# Patient Record
Sex: FEMALE | Race: BLACK OR AFRICAN AMERICAN
[De-identification: names, ages, dates, MRNs, and addresses within clinical notes are randomized per-mention and may not be internally consistent; named-entity substitution may affect disease eponyms.]

---

## 2021-04-11 ENCOUNTER — HOSPITAL ENCOUNTER (EMERGENCY)
Dept: HOSPITAL 61 - ER | Age: 26
Discharge: HOME | End: 2021-04-11
Payer: SELF-PAY

## 2021-04-11 VITALS — WEIGHT: 133.6 LBS | BODY MASS INDEX: 22.81 KG/M2 | HEIGHT: 64 IN

## 2021-04-11 VITALS — SYSTOLIC BLOOD PRESSURE: 118 MMHG | DIASTOLIC BLOOD PRESSURE: 77 MMHG

## 2021-04-11 DIAGNOSIS — R30.9: Primary | ICD-10-CM

## 2021-04-11 DIAGNOSIS — J45.909: ICD-10-CM

## 2021-04-11 DIAGNOSIS — Z91.018: ICD-10-CM

## 2021-04-11 DIAGNOSIS — Z20.2: ICD-10-CM

## 2021-04-11 DIAGNOSIS — Z88.8: ICD-10-CM

## 2021-04-11 LAB
APTT PPP: YELLOW S
BACTERIA #/AREA URNS HPF: (no result) /HPF
BILIRUB UR QL STRIP: NEGATIVE
FIBRINOGEN PPP-MCNC: CLEAR MG/DL
NITRITE UR QL STRIP: NEGATIVE
PH UR STRIP: 6 [PH]
PROT UR STRIP-MCNC: NEGATIVE MG/DL
RBC #/AREA URNS HPF: (no result) /HPF (ref 0–2)
UROBILINOGEN UR-MCNC: 0.2 MG/DL
WBC #/AREA URNS HPF: (no result) /HPF (ref 0–4)

## 2021-04-11 PROCEDURE — 87491 CHLMYD TRACH DNA AMP PROBE: CPT

## 2021-04-11 PROCEDURE — 81025 URINE PREGNANCY TEST: CPT

## 2021-04-11 PROCEDURE — 81001 URINALYSIS AUTO W/SCOPE: CPT

## 2021-04-11 PROCEDURE — 99284 EMERGENCY DEPT VISIT MOD MDM: CPT

## 2021-04-11 PROCEDURE — 96372 THER/PROPH/DIAG INJ SC/IM: CPT

## 2021-04-11 PROCEDURE — 87086 URINE CULTURE/COLONY COUNT: CPT

## 2021-04-11 PROCEDURE — 87591 N.GONORRHOEAE DNA AMP PROB: CPT

## 2021-04-11 NOTE — PHYS DOC
Past Medical History


Past Medical History:  Asthma


Past Surgical History:  No Surgical History


Smoking Status:  Never Smoker


Alcohol Use:  Occasionally


Drug Use:  None





General Adult


EDM:


Chief Complaint:  SEXUALLY TRANSMITTED DISEASE





HPI:


HPI:





Patient is a 26  year old female who presents with states that her boyfriend has

burning with urination and she wants to be checked for sexually transmitted 

diseases.  Patient denies urinary symptoms, abnormal vaginal discharge, vaginal 

itching, abnormal vaginal bleeding, abdominal pain, fever, back pain, nausea, 

vomiting, diarrhea.  Denies any pain.





Review of Systems:


Review of Systems:


Constitutional:   Denies fever or chills. []


Eyes:   Denies change in visual acuity. []


HENT:   Denies nasal congestion or sore throat. [] 


Respiratory:   Denies cough or shortness of breath. [] 


Cardiovascular:   Denies chest pain or edema. [] 


GI:   Denies abdominal pain, nausea, vomiting, bloody stools or diarrhea. [] 


:  Denies dysuria.  +Concern for sexually transmitted disease [] 


Musculoskeletal:   Denies back pain or joint pain. [] 


Integument:   Denies rash. [] 


Neurologic:   Denies headache, focal weakness or sensory changes. [] 


Endocrine:   Denies polyuria or polydipsia. [] 


Lymphatic:  Denies swollen glands. [] 


Psychiatric:  Denies depression or anxiety. []





Heart Score:


C/O Chest Pain:  No


Risk Factors:


Risk Factors:  DM, Current or recent (<one month) smoker, HTN, HLP, family 

history of CAD, obesity.


Risk Scores:


Score 0 - 3:  2.5% MACE over next 6 weeks - Discharge Home


Score 4 - 6:  20.3% MACE over next 6 weeks - Admit for Clinical Observation


Score 7 - 10:  72.7% MACE over next 6 weeks - Early Invasive Strategies





Current Medications:





Current Medications








 Medications


  (Trade)  Dose


 Ordered  Sig/Virgil  Start Time


 Stop Time Status Last Admin


Dose Admin


 


 Azithromycin


  (Zithromax)  1,000 mg  1X  ONCE  4/11/21 11:00


 4/11/21 11:01   





 


 Ceftriaxone Sodium


  (Rocephin Im)  500 mg  1X  ONCE  4/11/21 11:00


 4/11/21 11:01   














Allergies:


Allergies:





Allergies








Coded Allergies Type Severity Reaction Last Updated Verified


 


  cat dander Allergy Intermediate wheezing, sneezing 4/11/21 Yes


 


  watermelon Allergy Intermediate throat itching, wheezing 4/11/21 Yes


 


Uncoded Allergies Type Severity Reaction Last Updated Verified


 


  honeydew melon Allergy Intermediate throat itching, wheezing 4/11/21 











Physical Exam:


PE:





Constitutional: Well developed, well nourished, no acute distress, non-toxic 

appearance. []


HENT: Normocephalic, atraumatic, bilateral external ears normal, oropharynx 

moist, no oral exudates, nose normal. []


Eyes: PERRLA, EOMI, conjunctiva normal, no discharge. [] 


Neck: Normal range of motion, no tenderness, supple, no stridor. [] 


Cardiovascular:Heart rate regular rhythm, no murmur []


Lungs & Thorax:  Bilateral breath sounds clear to auscultation []


Abdomen: Bowel sounds normal, soft, no tenderness, no masses, no pulsatile 

masses. [] 


Skin: Warm, dry, no erythema, no rash. [] 


Back: No tenderness, no CVA tenderness. [] 


Extremities: No tenderness, no cyanosis, no clubbing, ROM intact, no edema. [] 


Neurologic: Alert and oriented X 3, normal motor function, normal sensory 

function, no focal deficits noted. []


Psychologic: Affect normal, judgement normal, mood normal. []





**Normal physical exam





Current Patient Data:


Labs:





                                Laboratory Tests








Test


 4/11/21


08:42


 


POC Urine HCG, Qualitative


 Hcg negative


(Negative)








Vital Signs:





                                   Vital Signs








  Date Time  Temp Pulse Resp B/P (MAP) Pulse Ox O2 Delivery O2 Flow Rate FiO2


 


4/11/21 09:37 98.2 88 18 134/84 (101) 99 Room Air  





 98.2       











EKG:


EKG:


[]





Radiology/Procedures:


Radiology/Procedures:


[]





Course & Med Decision Making:


Course & Med Decision Making


Pertinent Labs and Imaging studies reviewed. (See chart for details)





See HPI.  Alert and oriented x4.  Skin pink warm and dry.  Vital signs within 

normal limits.  Speaks in full clear sentences.  Abdomen is soft and nontender. 

 Patient states she would like to be treated prophylactically with antibiotics 

and I have ordered her azithromycin and Rocephin in the ED.





Pelvic Exam: Chaperone present   


 Abdomen:      Nontender


 External Genitalia:   Normal Skin


 Speculum:      Normal vaginal mucosa, white cervical discharge


 Bimanual:       No adnexal masses or tenderness, No CMT








[]





Dragon Disclaimer:


Dragon Disclaimer:


This electronic medical record was generated, in whole or in part, using a voice

 recognition dictation system.





Departure


Departure


Impression:  


   Primary Impression:  


   Concern about sexually transmitted disease in female without diagnosis


Disposition:  01 DC HOME SELF CARE/HOMELESS


Condition:  STABLE


Referrals:  


NO PCP (PCP)








MIRANDA SIMPSON Jr, MD


Patient Instructions:  Sexually Transmitted Disease





Additional Instructions:  


Follow-up with a primary care doctor or a gynecologist if needed.  Do not have 

sex for 10 days after you have been treated.  You will be called in 48 hours 

only if your chlamydia or gonorrhea come back positive.











MATTEO HESS APRN            Apr 11, 2021 10:20

## 2021-05-17 ENCOUNTER — HOSPITAL ENCOUNTER (EMERGENCY)
Dept: HOSPITAL 61 - ER | Age: 26
Discharge: HOME | End: 2021-05-17
Payer: SELF-PAY

## 2021-05-17 VITALS — WEIGHT: 129.85 LBS | BODY MASS INDEX: 22.17 KG/M2 | HEIGHT: 64 IN

## 2021-05-17 VITALS — DIASTOLIC BLOOD PRESSURE: 78 MMHG | SYSTOLIC BLOOD PRESSURE: 127 MMHG

## 2021-05-17 DIAGNOSIS — J45.909: ICD-10-CM

## 2021-05-17 DIAGNOSIS — F41.9: Primary | ICD-10-CM

## 2021-05-17 PROCEDURE — 99283 EMERGENCY DEPT VISIT LOW MDM: CPT

## 2021-05-17 NOTE — PHYS DOC
Past Medical History


Past Medical History:  Anxiety, Asthma


Past Surgical History:  No Surgical History


Smoking Status:  Never Smoker


Alcohol Use:  None


Drug Use:  None





General Adult


EDM:


Chief Complaint:  ANXIETY/PANIC ATTACK





HPI:


HPI:





Patient is a 26  year old  female who presented to the ER due to anxiety proble

m.  Patient was seen at [facility] last week for the same problem, diagnosed 

with anxiety.  Patient was put on hydroxyzine 25 mg to be taken every 6 hours as

needed for anxiety.  Patient started having Ang issue again last night, she 

had taken 3 doses of her hydroxyzine but not getting any better.  So she came 

here for evaluation.  Patient denies any abdominal pain, no chest pain, no 

trouble breathing.  Patient has some diarrhea this morning.  Patient complains 

of not able to sleep last night due to anxiety.  Patient denies any suicidal 

ideation or homicidal ideation.





Review of Systems:


Review of Systems:


Constitutional:   Denies fever or chills. []


Eyes:   Denies change in visual acuity. []


HENT:   Denies nasal congestion or sore throat. [] 


Respiratory:   Denies cough or shortness of breath. [] 


Cardiovascular:   Denies chest pain or edema. [] 


GI:   Denies abdominal pain, nausea, vomiting, bloody stools or diarrhea. [] 


:  Denies dysuria. [] 


Musculoskeletal:   Denies back pain or joint pain. [] 


Integument:   Denies rash. [] 


Neurologic:   Denies headache, focal weakness or sensory changes. [] 


Endocrine:   Denies polyuria or polydipsia. [] 


Lymphatic:  Denies swollen glands. [] 


Psychiatric: Positive for anxiety, no depression, no SI, no HI.





Heart Score:


C/O Chest Pain:  N/A


Risk Factors:


Risk Factors:  DM, Current or recent (<one month) smoker, HTN, HLP, family 

history of CAD, obesity.


Risk Scores:


Score 0 - 3:  2.5% MACE over next 6 weeks - Discharge Home


Score 4 - 6:  20.3% MACE over next 6 weeks - Admit for Clinical Observation


Score 7 - 10:  72.7% MACE over next 6 weeks - Early Invasive Strategies





Current Medications:





Current Medications








 Medications


  (Trade)  Dose


 Ordered  Sig/Virgil  Start Time


 Stop Time Status Last Admin


Dose Admin


 


 Alprazolam


  (Xanax)  0.25 mg  1X  ONCE  5/17/21 09:00


 5/17/21 09:01   














Allergies:


Allergies:





Allergies








Coded Allergies Type Severity Reaction Last Updated Verified


 


  cat dander Allergy Intermediate wheezing, sneezing 5/17/21 Yes


 


  watermelon Allergy Intermediate throat itching, wheezing 5/17/21 Yes


 


Uncoded Allergies Type Severity Reaction Last Updated Verified


 


  honeydew melon Allergy Intermediate throat itching, wheezing 4/11/21 











Physical Exam:


PE:





Constitutional: Well developed, well nourished, no acute distress, non-toxic 

appearance. []


HENT: Normocephalic, atraumatic, bilateral external ears normal, oropharynx 

moist, no oral exudates, nose normal. []


Eyes: PERRLA, EOMI, conjunctiva normal, no discharge. [] 


Neck: Normal range of motion, no tenderness, supple, no stridor. [] 


Cardiovascular:Heart rate regular rhythm, no murmur []


Lungs & Thorax:  Bilateral breath sounds clear to auscultation []


Abdomen: Bowel sounds normal, soft, no tenderness, no masses, no pulsatile 

masses. [] 


Skin: Warm, dry, no erythema, no rash. [] 


Back: No tenderness, no CVA tenderness. [] 


Extremities: No tenderness, no cyanosis, no clubbing, ROM intact, no edema. [] 


Neurologic: Alert and oriented X 3, normal motor function, normal sensory 

function, no focal deficits noted. []


Psychologic: Affect normal, judgement normal, mood normal.  Denies suicidal 

ideation, denies homicidal ideation.





Current Patient Data:


Vital Signs:





                                   Vital Signs








  Date Time  Temp Pulse Resp B/P (MAP) Pulse Ox O2 Delivery O2 Flow Rate FiO2


 


5/17/21 08:17 98.6 81 20 133/83 (100) 100 Room Air  





 98.6       











EKG:


EKG:


[]





Radiology/Procedures:


Radiology/Procedures:


[]





Course & Med Decision Making:


Course & Med Decision Making


Pertinent Labs and Imaging studies reviewed. (See chart for details)





Patient is a 18-year-old female who presented to ER due to anxiety.  Patient 

will need to follow her doctor at [facility] for long-term management of her 

anxiety problem.  Patient currently is not suicidal or homicidal ideation.





Dragon Disclaimer:


Dragon Disclaimer:


This electronic medical record was generated, in whole or in part, using a voice

 recognition dictation system.





Departure


Departure


Impression:  


   Primary Impression:  


   Anxiety


Disposition:  01 HOME / SELF CARE / HOMELESS


Condition:  STABLE


Referrals:  


NO PCP (PCP)


Follow up with your doctor as [facility] this week.


Patient Instructions:  Anxiety and Panic Attacks





Additional Instructions:  


[facility]


[address]


[place]  [address]


[phone]





[facility]


[address] [place]  [address]


[phone]





[facility]


[address]


[place]  [address]


[phone]





[facility]


[address]


[place]  [address]


[phone]





[facility]


[address] [place]  [address]


[phone]





[facility]


[address] [place]  [address]


[phone]





[facility]


[address]


[place]  [address]


[phone]





[facility]


[address]


[place]  [address]


[phone]





[facility] 


[address]


[place]  [address]


[phone]





[facility]


[address]


[place]  [address]


[phone]











JOSE ANTONIO MOORE DO                May 17, 2021 09:01

## 2021-06-06 ENCOUNTER — HOSPITAL ENCOUNTER (EMERGENCY)
Dept: HOSPITAL 61 - ER | Age: 26
Discharge: HOME | End: 2021-06-06
Payer: SELF-PAY

## 2021-06-06 VITALS — WEIGHT: 130.07 LBS | BODY MASS INDEX: 22.21 KG/M2 | HEIGHT: 64 IN

## 2021-06-06 VITALS — SYSTOLIC BLOOD PRESSURE: 148 MMHG | DIASTOLIC BLOOD PRESSURE: 87 MMHG

## 2021-06-06 DIAGNOSIS — K02.9: Primary | ICD-10-CM

## 2021-06-06 DIAGNOSIS — J45.909: ICD-10-CM

## 2021-06-06 DIAGNOSIS — Z91.018: ICD-10-CM

## 2021-06-06 DIAGNOSIS — K08.89: ICD-10-CM

## 2021-06-06 DIAGNOSIS — Z88.8: ICD-10-CM

## 2021-06-06 PROCEDURE — 96372 THER/PROPH/DIAG INJ SC/IM: CPT

## 2021-06-06 PROCEDURE — 99283 EMERGENCY DEPT VISIT LOW MDM: CPT

## 2021-06-06 NOTE — PHYS DOC
Past Medical History


Past Medical History:  No Pertinent History, Anxiety, Asthma


Past Surgical History:  No Surgical History


Smoking Status:  Never Smoker


Alcohol Use:  None


Drug Use:  None





General Adult


EDM:


Chief Complaint:  DENTAL PROBLEM





HPI:


HPI:





Patient is a 26  year old [f__sex] who presents with []





Review of Systems:


Review of Systems:


Constitutional:   Denies fever or chills. []


Eyes:   Denies change in visual acuity. []


HENT:   Denies nasal congestion or sore throat. [] 


Respiratory:   Denies cough or shortness of breath. [] 


Cardiovascular:   Denies chest pain or edema. [] 


GI:   Denies abdominal pain, nausea, vomiting, bloody stools or diarrhea. [] 


:  Denies dysuria. [] 


Musculoskeletal:   Denies back pain or joint pain. [] 


Integument:   Denies rash. [] 


Neurologic:   Denies headache, focal weakness or sensory changes. [] 


Endocrine:   Denies polyuria or polydipsia. [] 


Lymphatic:  Denies swollen glands. [] 


Psychiatric:  Denies depression or anxiety. []





Heart Score:


Risk Factors:


Risk Factors:  DM, Current or recent (<one month) smoker, HTN, HLP, family 

history of CAD, obesity.


Risk Scores:


Score 0 - 3:  2.5% MACE over next 6 weeks - Discharge Home


Score 4 - 6:  20.3% MACE over next 6 weeks - Admit for Clinical Observation


Score 7 - 10:  72.7% MACE over next 6 weeks - Early Invasive Strategies





Current Medications:





Current Medications








 Medications


  (Trade)  Dose


 Ordered  Sig/Virgil  Start Time


 Stop Time Status Last Admin


Dose Admin


 


 Amoxicillin/


 Clavulanate


 Potassium


  (Augmentin 875/


 125mg)  1 tab  1X  ONCE  6/6/21 03:00


 6/6/21 03:01 DC 6/6/21 03:01


1 TAB


 


 Bupivacaine HCl


  (Sensorcaine Mpf


 0.5%)  30 ml  1X  ONCE  6/6/21 03:00


 6/6/21 03:01 DC 6/6/21 03:04


30 ML


 


 Dexamethasone


  (Decadron)  10 mg  1X  ONCE  6/6/21 03:00


 6/6/21 03:01 DC 6/6/21 03:01


10 MG











Allergies:


Allergies:





Allergies








Coded Allergies Type Severity Reaction Last Updated Verified


 


  cat dander Allergy Intermediate wheezing, sneezing 5/17/21 Yes


 


  watermelon Allergy Intermediate throat itching, wheezing 5/17/21 Yes


 


Uncoded Allergies Type Severity Reaction Last Updated Verified


 


  honeydew melon Allergy Intermediate throat itching, wheezing 4/11/21 











Physical Exam:


PE:





Constitutional: Well developed, well nourished, no acute distress, non-toxic 

appearance. []


HENT: Normocephalic, atraumatic, bilateral external ears normal, oropharynx 

moist, no oral exudates, nose normal. []


Eyes: PERRLA, EOMI, conjunctiva normal, no discharge. [] 


Neck: Normal range of motion, no tenderness, supple, no stridor. [] 


Cardiovascular:Heart rate regular rhythm, no murmur []


Lungs & Thorax:  Bilateral breath sounds clear to auscultation []


Abdomen: Bowel sounds normal, soft, no tenderness, no masses, no pulsatile 

masses. [] 


Skin: Warm, dry, no erythema, no rash. [] 


Back: No tenderness, no CVA tenderness. [] 


Extremities: No tenderness, no cyanosis, no clubbing, ROM intact, no edema. [] 


Neurologic: Alert and oriented X 3, normal motor function, normal sensory 

function, no focal deficits noted. []


Psychologic: Affect normal, judgement normal, mood normal. []





Current Patient Data:


Vital Signs:





                                   Vital Signs








  Date Time  Temp Pulse Resp B/P (MAP) Pulse Ox O2 Delivery O2 Flow Rate FiO2


 


6/6/21 01:43 97.9 68 20 148/87 (107) 100 Room Air  





 97.9       











EKG:


EKG:


[]





Radiology/Procedures:


Radiology/Procedures:


[]





Course & Med Decision Making:


Course & Med Decision Making


Pertinent Labs and Imaging studies reviewed. (See chart for details)





[]





Dragon Disclaimer:


Dragon Disclaimer:


This electronic medical record was generated, in whole or in part, using a voice

 recognition dictation system.





Departure


Departure


Impression:  


   Primary Impression:  


   Dentalgia


   Additional Impression:  


   Dental caries


Disposition:  01 HOME / SELF CARE / HOMELESS


Condition:  STABLE


Referrals:  


NO PCP (PCP)


Patient Instructions:  Dental Caries, Toothache-Brief





Additional Instructions:  


May also use over-the-counter ibuprofen as needed for pain.





Please follow closely with a dentist for definitive treatment.


Scripts


Hydrocodone Bit/Acetaminophen (HYDROCODONE-APAP 5-325  **) 1 Tab Tablet


0.5-1 TAB PO PRN Q6HRS PRN for PAIN, #10 TAB 0 Refills


   Prov: GIULIANA JACOB DO         6/6/21 


Amoxicillin/Potassium Clav (AUGMENTIN 875-125 TABLET) 1 Each Tablet


1 TAB PO BID for 7 Days, #14 TAB


   Prov: GIULIANA JACOB DO         6/6/21 


Chlorhexidine Gluconate (PERIDEX) 15 Ml Mouthwash


15 ML PO BID for 7 Days, #473 ML 0 Refills


   Prov: GIULIANA JACOB DO         6/6/21











GIULIANA JACOB DO              Jun 6, 2021 03:12